# Patient Record
(demographics unavailable — no encounter records)

---

## 2017-05-31 NOTE — UC
Respiratory Complaint HPI





- HPI Summary


HPI Summary: 


3 weeks of worsening cough and chest congestion, no fevers, 1 ppd smoker x 20 

years








- History of Current Complaint


Chief Complaint: UCRespiratory


Stated Complaint: DEEP CHEST CONGEST,COUGH


Time Seen by Provider: 05/31/17 15:23


Hx Obtained From: Patient


Hx Last Menstrual Period: 5/7/17


Pregnant?: No


Onset/Duration: Gradual Onset, Lasting Weeks - 3, Still Present


Severity Initially: Mild


Severity Currently: Moderate


Pain Intensity: 7


Pain Scale Used: 0-10 Numeric


Character: Cough: Productive


Aggravating Factors: Exertion, Deep Breaths


Alleviating Factors: Nothing


Associated Signs And Symptoms: Positive: Pleuritic Chest Pain, Wheezing, URI





- Allergies/Home Medications


Allergies/Adverse Reactions: 


 Allergies











Allergy/AdvReac Type Severity Reaction Status Date / Time


 


No Known Allergies Allergy   Verified 05/31/17 14:57














PMH/Surg Hx/FS Hx/Imm Hx


Previously Healthy: Yes





- Surgical History


Surgical History: Yes


Surgery Procedure, Year, and Place: APPENDECTOMY





- Family History


Known Family History: Positive: None - noncontributory





- Social History


Occupation: Employed Full-time


Lives: With Family


Alcohol Use: Occasionally


Substance Use Type: None


Smoking Status (MU): Heavy Every Day Tobacco Smoker


Type: Cigarettes


Amount Used/How Often: 1 PPD


Length of Time of Smoking/Using Tobacco: 20


Have You Smoked in the Last Year: Yes


Cessation Counseling: Counseled 3+Min - 10 Min





Review of Systems


Constitutional: Negative


Skin: Negative


Eyes: Negative


ENT: Negative


Respiratory: Cough


Cardiovascular: Negative


Gastrointestinal: Negative


Genitourinary: Negative


Motor: Negative


Neurovascular: Negative


Musculoskeletal: Negative


Neurological: Negative


Psychological: Negative


All Other Systems Reviewed And Are Negative: Yes





Physical Exam


Triage Information Reviewed: Yes


Appearance: Well-Appearing, No Pain Distress, Well-Nourished


Vital Signs: 


 Initial Vital Signs











Temp  98.0 F   05/31/17 14:58


 


Pulse  60   05/31/17 14:58


 


Resp  16   05/31/17 14:58


 


BP  107/67   05/31/17 14:58


 


Pulse Ox  96   05/31/17 14:58











Vital Signs Reviewed: Yes


Eye Exam: Normal


Eyes: Positive: Conjunctiva Clear


ENT Exam: Normal


ENT: Positive: Normal ENT inspection, Hearing grossly normal, Pharynx normal, 

TMs normal.  Negative: Nasal congestion, Nasal drainage, Tonsillar swelling, 

Tonsillar exudate, Trismus, Muffled/hoarse voice


Dental Exam: Normal


Neck exam: Normal


Neck: Positive: Supple, Nontender, No Lymphadenopathy


Respiratory Exam: Other


Respiratory: Positive: Chest non-tender, No respiratory distress, No accessory 

muscle use, Wheezing -  B/L


Cardiovascular Exam: Normal


Cardiovascular: Positive: RRR, No Murmur, Pulses Normal, Brisk Capillary Refill


Musculoskeletal Exam: Normal


Musculoskeletal: Positive: Strength Intact, ROM Intact, No Edema


Neurological Exam: Normal


Neurological: Positive: Alert, Muscle Tone Normal


Psychological Exam: Normal


Skin Exam: Normal





UC Diagnostic Evaluation





- Laboratory


O2 Sat by Pulse Oximetry: 96





Respiratory Course/Dx





- Course


Course Of Treatment: Zithromax, prednisone, (refused Albuterol), nicotine 

cesation information, increase fluids follow with pcp





- Differential Dx/Diagnosis


Differential Diagnosis/HQI/PQRI: Bronchitis, Exacerbation Of COPD, Laryngitis, 

Lower Resp Infection, Sinusitis


Provider Diagnoses: Bronchitis, nicotine dependant





Discharge





- Discharge Plan


Condition: Stable


Disposition: HOME


Prescriptions: 


Azithromycin TAB* [Zithromax TAB (Z-TOR) 250 mg #6 tabs] 2 tab PO .TODAY, THEN 

1 DAILY #1 tor


predniSONE TAB* [Deltasone TAB*] 10 mg PO DAILY #20 tab


Patient Education Materials:  How to Stop Smoking (ED), Acute Bronchitis (ED), 

Bronchospasm (ED), Wheezing (ED)


Referrals: 


Rock CHANEY,Santana HAYWARD [Medical Doctor] - 1 Week

## 2017-06-20 NOTE — RAD
INDICATION: RIGHT lower lobe region pain. Cough and congestion.



COMPARISON: July 20, 2014 CT.



TECHNIQUE:  Dual energy PA and routine lateral views of the chest were obtained.



REPORT:  Clear lungs and pleural spaces. Negative for pneumothorax. The heart, pulmonary

vasculature, and mediastinal contours are unremarkable. Healed RIGHT 9th, 10th, and 11th

rib fractures. No acute rib fracture evident.



IMPRESSION:  No evidence for pneumonia. No evidence for acute intrathoracic disease.

## 2017-06-21 NOTE — ED
HPI Cardiac





- HPI Summary


HPI Summary: 


Pt here w/ cough x weeks. Started as allergy sx - itchy, watery eyes, hives on 

skin. 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 

horses. This progressed into nasal congestion and cough. Tried anti-histamine 

which relieved ocular sx, nasal congestion and hives but cough lingered. Kids 

were having similar sx as well so they all went to . Pt rx'd zithromax and 

prednisone as she was wheezing. States brief relief but even more relief when 

she tried her daughter's albuterol inhaler. This is now gone as daughter 

completed use during her illness. Pt denies known h/o asthma, COPD however 

admits to h/o smoking. Has been smoking less as it hurts her lungs to so this 

now. She is very motivated to quit smoking. Quit in the past when she was 

pregnant w/ nicotine patch and e-cig. Denies fever, chills, N/V/D.





- History of Current Complaint


Chief Complaint: EDGeneral


Stated Complaint: CHEST CONGESTION/BACK PAIN


Time Seen by Provider: 06/20/17 12:43


Hx Obtained From: Patient


Pain Intensity: 3





- Allergy/Home Medications


Allergies/Adverse Reactions: 


 Allergies











Allergy/AdvReac Type Severity Reaction Status Date / Time


 


No Known Allergies Allergy   Verified 05/31/17 14:57














PMH/Surg Hx/FS Hx/Imm Hx


Previously Healthy: Yes


Endocrine/Hematology History: 


   Denies: Autoimmune Disease


Respiratory History: Reports: Hx Chronic Bronchitis - clinically gets 

bronchitis annually and smokes, Hx Chronic Obstructive Pulmonary Disease (COPD)

, Hx Seasonal Allergies - hay fever every spring/summer





- Surgical History


Surgery Procedure, Year, and Place: APPENDECTOMY


Infectious Disease History: No


Infectious Disease History: 


   Denies: Traveled Outside the [place] in Last 30 Days





- Family History


Known Family History: Positive: None - noncontributory





- Social History


Lives: With Family


Alcohol Use: Occasionally


Hx Substance Use: No


Substance Use Type: Reports: None


Hx Tobacco Use: Yes


Smoking Status (MU): Current Every Day Smoker


Type: Cigarettes


Amount Used/How Often: 1 PPD


Length of Time of Smoking/Using Tobacco: 20


Have You Smoked in the Last Year: Yes





Review of Systems


Constitutional: Negative


Negative: Fever, Chills


Negative: Drainage, Erythema


Negative: Sore Throat, Ear Ache, Nasal Discharge


Negative: Palpitations, Chest Pain


Positive: Cough.  Negative: Shortness Of Breath


Gastrointestinal: Negative


Positive: no symptoms reported


Musculoskeletal: Negative


Negative: Rash


Neurological: Negative


Psychological: Normal


All Other Systems Reviewed And Are Negative: Yes





Physical Exam


Triage Information Reviewed: Yes


Vital Signs On Initial Exam: 


 Initial Vitals











Temp Pulse Resp BP Pulse Ox


 


 97.9 F   55   20   108/68   100 


 


 06/20/17 12:34  06/20/17 12:34  06/20/17 12:34  06/20/17 12:34  06/20/17 12:34











Vital Signs Reviewed: Yes


Appearance: Positive: Well-Appearing, No Pain Distress, Well-Nourished


Skin: Positive: Warm, Dry - no rash


Head/Face: Positive: Normal Head/Face Inspection


Eyes: Positive: Normal, EOMI, MAN, Conjunctiva Clear.  Negative: Conjunctiva 

Inflammed, Discharge


ENT: Positive: Normal ENT inspection, Hearing grossly normal, Pharynx normal, 

TMs normal.  Negative: Nasal congestion, Nasal drainage, Tonsillar swelling, 

Tonsillar exudate


Neck: Positive: Supple, Nontender, No Lymphadenopathy


Respiratory/Lung Sounds: Positive: Clear to Auscultation, Breath Sounds 

Present.  Negative: Rales, Rhonchi, Stridor, Wheezes


Cardiovascular: Positive: Normal, RRR, S1, S2


Abdomen Description: Positive: Nontender, No Organomegaly, Soft


Bowel Sounds: Positive: Present


Musculoskeletal: Positive: Normal, Strength/ROM Intact


Neurological: Positive: Normal, Sensory/Motor Intact, Alert, Oriented to Person 

Place, Time, CN Intact II-III


Psychiatric: Positive: Normal





Procedures





- Procedure Summary


Procedure Summary: 





Duoneb - pt had relief of sx/cough





Diagnostics





- Vital Signs


 Vital Signs











  Temp Pulse Resp BP Pulse Ox


 


 06/20/17 14:41  98.2 F  57  16  105/55 


 


 06/20/17 13:45   72   


 


 06/20/17 12:36  97.9 F  61  20  108/68  99


 


 06/20/17 12:34  97.9 F  55  20  108/68  100














- Laboratory


Lab Statement: Any lab studies that have been ordered have been reviewed, and 

results considered in the medical decision making process.





Re-Evaluation





- Re-Evaluation


  ** First Eval


Change: Improved





Disposition





- Course


Course Of Treatment: Suspect pt has asthma or COPD as she gets recurrent 

breathing issues w/ hayfever attacks this time of year. > 3 mins counseling on 

smoking cessation as pt is motivated to quit. Also suggested discussing PFT's w

/ PCP or pulmonology/allergist. Pt is receptive to recommendations and agrees 

to f/u. Discussed danger s/sx of when to return to ED.





- Diagnoses


Provider Diagnoses: 


 Reactive airway disease








Discharge





- Discharge Plan


Condition: Stable


Disposition: HOME


Prescriptions: 


Albuterol HFA INHALER* [Ventolin HFA Inhaler*] 2 puff INH Q6H PRN #1 mdi


 PRN Reason: Cough


Patient Education Materials:  Allergies (ED), Bronchospasm (ED)


Referrals: 


Neelima Acosta MD [Medical Doctor] - 


Christiano Alejandre MD [Primary Care Provider] - 3 Days


Additional Instructions: 


Use albuterol as directed


Avoid triggers as much as possible (ie. allergens, smoking, etc)


You may continue anti-histamines as well


It is important that you follow-up with your PCP for pulmonary testing. If this 

is not an option with current turnover at your PCP's office, you may consider 

an allergy/pulmonogy consult as this is a recurring issue this time of year.


*If you develop fever, chills, shortness of breath, bloody cough, difficulty 

breathing, return to ED ASAP